# Patient Record
Sex: MALE | Race: WHITE | NOT HISPANIC OR LATINO | URBAN - METROPOLITAN AREA
[De-identification: names, ages, dates, MRNs, and addresses within clinical notes are randomized per-mention and may not be internally consistent; named-entity substitution may affect disease eponyms.]

---

## 2021-12-02 NOTE — PATIENT DISCUSSION
Discussed options of cataract surgery vs. glasses. Discussed R/B/A of cataract surgery. Patient elects surgery and [place] [facility]. Schedule A scan and retinal clearance with [facility].

## 2021-12-02 NOTE — PATIENT DISCUSSION
Hx [place] [facility] with CL[facility] , has been wearing distance lens OS and near OD. Discussed pt's dominant eye is OD and will consider  focusing distance with cataract surgery.

## 2021-12-14 NOTE — PATIENT DISCUSSION
Discussed options of cataract surgery vs. glasses. Discussed R/B/A of cataract surgery. Patient elects surgery and [place] [facility]. Schedule A scan and retinal clearance with [facility].

## 2021-12-14 NOTE — PATIENT DISCUSSION
Hx [place] [facility] CL[facility] , has been wearing distance lens OS and near OD. Discussed pt's dominant eye is OD and will consider  focusing distance with cataract surgery.

## 2022-01-27 NOTE — PATIENT DISCUSSION
Consider OS to follow . EYE OS, IOL TYPE CUSTOM, POST OPERATIVE [employer] -2.25/-2.50, PACKAGE ADVANCED.

## 2022-01-27 NOTE — PATIENT DISCUSSION
The patient elects monovision, Right eye for distance. Pt understands he will be trading natural focus from near to distance.

## 2022-01-27 NOTE — PATIENT DISCUSSION
Hx [place] [facility] CL[facility] , has been wearing distance lens OS and near OD. Discussed pt's dominant eye is OD and will consider  focusing distance with cataract surgery.

## 2022-01-27 NOTE — PATIENT DISCUSSION
ADDENDUM 2/1/22 PT CALLED DEFERRING CUSTOM PACKAGE, WANTS BASIC IOL. AWARE HE MAY NEED TO CORRECT WITH GLASSES OR CL'S [facility] .

## 2022-02-15 NOTE — PATIENT DISCUSSION
Hx [place] [facility] , has been wearing distance lens OS and near OD. Discussed pt's dominant eye is OD and will consider  focusing distance with cataract surgery.

## 2022-02-16 NOTE — PATIENT DISCUSSION
Hx [place] [facility] , has been wearing distance lens OS and near OD. Discussed pt's dominant eye is OD and will consider  focusing distance with cataract surgery.

## 2022-02-23 NOTE — PATIENT DISCUSSION
Hx [place] [facility] , has been wearing distance lens OS and near OD. Discussed pt's dominant eye is OD and will consider  focusing distance with cataract surgery.

## 2022-03-01 NOTE — PATIENT DISCUSSION
Hx [place] [facility] , has been wearing distance lens OS and near OD. Discussed pt's dominant eye is OD and will consider  focusing distance with cataract surgery.

## 2022-03-02 NOTE — PATIENT DISCUSSION
Hx [place] [facility] , has been wearing distance lens OS and near OD. Discussed pt's dominant eye is OD and will consider  focusing distance with cataract surgery.

## 2022-03-02 NOTE — PATIENT DISCUSSION
Left message for pt to make appt for labs.      Patient is doing well post-operatively. The importance of post-op drop compliance was emphasized. Drop schedule reviewed with patient. Patient to call if any visual changes or concerns.

## 2022-03-09 NOTE — PATIENT DISCUSSION
Hx [place] [facility] , has been wearing distance lens OS and near OD. Discussed pt's dominant eye is OD and will consider  focusing distance with cataract surgery.

## 2022-04-05 NOTE — PATIENT DISCUSSION
Hx [place] [facility] CL[facility] , has been wearing distance lens OS and near OD. Discussed pt's dominant eye is OD and will consider  focusing distance with cataract surgery.

## 2023-01-17 ENCOUNTER — ESTABLISHED PATIENT (OUTPATIENT)
Dept: URBAN - METROPOLITAN AREA CLINIC 4 | Facility: CLINIC | Age: 88
End: 2023-01-17

## 2023-01-17 DIAGNOSIS — H47.291: ICD-10-CM

## 2023-01-17 DIAGNOSIS — H02.834: ICD-10-CM

## 2023-01-17 DIAGNOSIS — H02.831: ICD-10-CM

## 2023-01-17 DIAGNOSIS — H11.153: ICD-10-CM

## 2023-01-17 PROCEDURE — 92015 DETERMINE REFRACTIVE STATE: CPT

## 2023-01-17 PROCEDURE — 92014 COMPRE OPH EXAM EST PT 1/>: CPT

## 2023-01-17 ASSESSMENT — KERATOMETRY
OD_AXISANGLE_DEGREES: 092
OD_K1POWER_DIOPTERS: 42.25
OS_AXISANGLE_DEGREES: 100
OS_K1POWER_DIOPTERS: 42.25
OS_AXISANGLE2_DEGREES: 10
OD_K2POWER_DIOPTERS: 43.50
OS_K2POWER_DIOPTERS: 43.25
OD_AXISANGLE2_DEGREES: 2

## 2023-01-17 ASSESSMENT — VISUAL ACUITY
OU_CC: 20/25
OD_CC: 20/50+1
OS_GLARE: 20/40
OD_GLARE: 20/200
OS_CC: 20/25-1

## 2023-01-17 ASSESSMENT — TONOMETRY
OS_IOP_MMHG: 16
OD_IOP_MMHG: 16

## 2024-01-18 ENCOUNTER — ESTABLISHED PATIENT (OUTPATIENT)
Facility: LOCATION | Age: 89
End: 2024-01-18

## 2024-01-18 DIAGNOSIS — E11.9: ICD-10-CM

## 2024-01-18 DIAGNOSIS — H11.153: ICD-10-CM

## 2024-01-18 DIAGNOSIS — H47.291: ICD-10-CM

## 2024-01-18 DIAGNOSIS — H02.831: ICD-10-CM

## 2024-01-18 DIAGNOSIS — H02.834: ICD-10-CM

## 2024-01-18 PROCEDURE — 92014 COMPRE OPH EXAM EST PT 1/>: CPT

## 2024-01-18 PROCEDURE — 92015 DETERMINE REFRACTIVE STATE: CPT

## 2024-01-18 ASSESSMENT — KERATOMETRY
OS_AXISANGLE_DEGREES: 99
OS_K2POWER_DIOPTERS: 43.50
OD_K1POWER_DIOPTERS: 42.25
OD_K2POWER_DIOPTERS: 43.75
OD_AXISANGLE2_DEGREES: 178
OS_K1POWER_DIOPTERS: 42.50
OS_AXISANGLE2_DEGREES: 9
OD_AXISANGLE_DEGREES: 88

## 2024-01-18 ASSESSMENT — TONOMETRY
OD_IOP_MMHG: 14
OS_IOP_MMHG: 15

## 2024-01-18 ASSESSMENT — VISUAL ACUITY
OS_CC: 20/25-2
OS_GLARE: 20/60
OU_CC: 20/25
OD_CC: 20/50

## 2025-02-03 ENCOUNTER — COMPREHENSIVE EXAM (OUTPATIENT)
Age: OVER 89
End: 2025-02-03

## 2025-02-03 DIAGNOSIS — H02.834: ICD-10-CM

## 2025-02-03 DIAGNOSIS — H11.153: ICD-10-CM

## 2025-02-03 DIAGNOSIS — H02.831: ICD-10-CM

## 2025-02-03 DIAGNOSIS — H11.32: ICD-10-CM

## 2025-02-03 DIAGNOSIS — E11.9: ICD-10-CM

## 2025-02-03 DIAGNOSIS — H47.291: ICD-10-CM

## 2025-02-03 PROCEDURE — 92015 DETERMINE REFRACTIVE STATE: CPT

## 2025-02-03 PROCEDURE — 92014 COMPRE OPH EXAM EST PT 1/>: CPT
